# Patient Record
Sex: MALE | Race: BLACK OR AFRICAN AMERICAN | NOT HISPANIC OR LATINO | Employment: FULL TIME | URBAN - METROPOLITAN AREA
[De-identification: names, ages, dates, MRNs, and addresses within clinical notes are randomized per-mention and may not be internally consistent; named-entity substitution may affect disease eponyms.]

---
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
## 2022-04-30 NOTE — ED PROVIDER NOTES
Patient:   Danielle Verma             MRN: [id number]            FIN: [id number]               Age:   19 years     Sex:  Male     :  2000   Associated Diagnoses:   Laceration without foreign body of right little finger without damage to nail, initial encounter   Author:   Anitha Johnson      Basic Information   Time seen: Date & time 2020 20:35:00.   History source: Patient.   Arrival mode: Private vehicle, walking.   History limitation: None.   Additional information: Patient's physician(s): Prerna WILKES, Melina HALL, Chief Complaint from Nursing Triage Note : Chief Complaint   2020 20:19 CST      Chief Complaint           Accident with a broom and a dog, 5th finger on right hand and a laceration.  .      History of Present Illness   The patient presents with right, finger pain, laceration .  The onset was just prior to arrival.  The course/duration of symptoms is constant.  Type of injury: laceration.  The location where the incident occurred was at home.  Location: Right 5th distal finger . The character of symptoms is pain.  The degree of pain is minimal.  The degree of swelling is none.  The exacerbating factor is palpation.  Prior episodes: none.  Therapy today: none.  Associated symptoms: none.        Review of Systems   Constitutional symptoms:  Negative except as documented in HPI.   Skin symptoms:  Negative except as documented in HPI.   Respiratory symptoms:  Negative except as documented in HPI.   Gastrointestinal symptoms:  Negative except as documented in HPI.   Musculoskeletal symptoms:  Negative except as documented in HPI.   Neurologic symptoms:  Negative except as documented in HPI.      Health Status   Allergies:    Allergic Reactions (Selected)  No Known Allergies,    Allergies (1) Active Reaction  No Known Allergies None Documented  .   Medications:  (Selected)   Documented Medications  Documented  OFLOXACIN 0.3% EYE DROPS: .   Immunizations: Tetanus up to date.      Past  Medical/ Family/ Social History   Medical history: Negative.   Surgical history: Negative.   Family history: Not significant.   Social history:    Social & Psychosocial Habits    Alcohol  08/02/2016  Use: Never    Home/Environment  12/29/2017  Lives with: Mother, Siblings    Substance Use  08/02/2016  Use: Never    Tobacco  08/02/2016  Use: Never smoker    11/24/2018  Use: Never smoker    Patient Wants Consult For Cessation Counseling N/A    02/28/2020  Use: Never (less than 100 in l    Patient Wants Consult For Cessation Counseling No    Abuse/Neglect  02/28/2020  SHX Any signs of abuse or neglect No  , Alcohol use: Denies, Tobacco use: Denies, Drug use: Denies, Occupation: A student, Family/social situation: Unmarried, lives with parent(s).   Problem list:    No qualifying data available  .      Physical Examination               Vital Signs   Vital Signs   2/28/2020 20:19 CST      Temperature Oral          36.7 DegC                             Temperature Oral (calculated)             98.06 DegF                             Peripheral Pulse Rate     72 bpm                             Respiratory Rate          16 br/min                             SpO2                      100 %                             Oxygen Therapy            Room air                             Systolic Blood Pressure   142 mmHg  HI                             Diastolic Blood Pressure  74 mmHg  .      Vital Signs (last 24 hrs)_____  Last Charted___________  Temp Oral     36.7 DegC  (FEB 28 20:19)  Heart Rate Peripheral   72 bpm  (FEB 28 20:19)  Resp Rate         16 br/min  (FEB 28 20:19)  SBP      H 142mmHg  (FEB 28 20:19)  DBP      74 mmHg  (FEB 28 20:19)  SpO2      100 %  (FEB 28 20:19)  Weight      79 kg  (FEB 28 20:19)  Height      167 cm  (FEB 28 20:19)  BMI      28.33  (FEB 28 20:19)  .   Measurements   2/28/2020 20:19 CST      Weight Dosing             79 kg                             Weight Measured           79 kg                              Weight Measured and Calculated in Lbs     174.16 lb                             Height/Length Dosing      167 cm                             Height/Length Measured    167 cm                             Body Mass Index Measured  28.33 kg/m2  .   Basic Oxygen Information   2/28/2020 20:19 CST      SpO2                      100 %                             Oxygen Therapy            Room air  .   General:  Alert, no acute distress.    Skin:  Warm, dry, normal for ethnicity.    Head:  Normocephalic.   Ears, nose, mouth and throat:  Oral mucosa moist.   Respiratory:  Respirations are non-labored.   Musculoskeletal:  Normal ROM, Fingers/toes: Right, distal, fifth, finger(s), tenderness, laceration .    Neurological:  Alert and oriented to person, place, time, and situation, No focal neurological deficit observed.    Psychiatric:  Cooperative, appropriate mood & affect, normal judgment.       Medical Decision Making   Results review:     No qualifying data available.   Radiology results:  02/28/2020 20:57; Sandoval FNP, Anitha R; Negative;.      Reexamination/ Reevaluation   Vital signs   Basic Oxygen Information   2/28/2020 20:19 CST      SpO2                      100 %                             Oxygen Therapy            Room air        Procedure   Laceration repair   Consent: Parent, Patient, Has given verbal consent.       Description/ repair   Laceration  0.5 cm in length.Finger: on the left, digit # 5, medial, distal.   Shape: flap.   Depth: superficial.   Details: clean.   Neurovascular/ tendon exam: intact.   Preparation: sterile field established, skin prepped with chlorhexidine.   Irrigation: with saline.   Debridement: none.   Skin closure: Dermabond.   Complexity: single layer.   Post procedure exam: Circulation, motor, sensory examination intact, Bleeding controlled.    Complications: None.    Patient tolerated: Well.    Performed by: Self.    Procedure notes:   0.75 cm circular superficial laceration to  distal portion of R 5th digit. Finger soaked in hibaclens. No foreign bodies identified. Edges approximated and dermabond applied. No blood loss. Patient tolerated procedure.       Impression and Plan   Diagnosis   Laceration without foreign body of right little finger without damage to nail, initial encounter ([id number] S61.216A)   Plan   Condition: Improved, Stable.    Disposition: Medically cleared, Discharged: Time  2/28/2020 20:56:00, to home.    Patient was given the following educational materials: Wound Care, Adult, Laceration Care, Adult, Tissue Adhesive Wound Care, Tissue Adhesive Wound Care.    Follow up with: Wound Care Keep wound clean and dry.  Watch for signs of infection and report to your primary care provider.   Return to ED for worsening of symptoms, fever, uncontrolled bleeding.   Alternate Tylenol 650 mg with ibuprofen 600 mg every 4 hours for pain.   ; FU w/o XR The exam and treatment you received in Emergency Room was for an urgent problem and NOT INTENDED AS COMPLETE CARE. It is important that you FOLLOW UP with a doctor for ongoing care. If your symptoms become WORSE or you DO NOT IMPROVE and you are unable to reach your health care provider, you should RETURN to the emergency department.; Melina Graff.    Counseled: Patient, Regarding diagnosis, Regarding diagnostic results, Regarding treatment plan, Patient indicated understanding of instructions.    Orders: Launch Orders   Admit/Transfer/Discharge:  Discharge (Order): 2/28/2020 20:56 CST, Home.

## 2022-04-30 NOTE — ED PROVIDER NOTES
Patient:   Danielle Vemra             MRN: [id number]            FIN: [id number]               Age:   17 years     Sex:  Male     :  2000   Associated Diagnoses:   Ankle sprain; UC - Ankle Injury   Author:   Shiva WILKES, Pa MINER      Basic Information   Time seen: Date & time 2017 16:23:00.   History source: Patient, mother.   Arrival mode: Private vehicle, Hopping on 1 foot.   History limitation: None.   Additional information: Chief Complaint from Nursing Triage Note : Chief Complaint   2017 16:05 CDT       Chief Complaint           Reports right ankle pain after swelling after twisting while playing basketball PTA. Cold pack applied.  .      History of Present Illness   The patient presents with right, ankle injury, Patient playing basketball jumped up came down turning right ankle inward felt pop noticed swelling hurts to walk.  The onset was just prior to arrival.  The course/duration of symptoms is worsening.  Type of injury: fall and twisting.  Location: Right ankle. The character of symptoms is pain and swelling.  The degree at present is moderate.  There are exacerbating factors including movement, weight bearing and walking.  The relieving factor is none.  The location where the incident occurred was at home.  Risk factors consist of none.  Prior episodes: none.  Therapy today: none.        Review of Systems   Constitutional symptoms:  Negative except as documented in HPI.   Skin symptoms:  Negative except as documented in HPI.   Eye symptoms:  Negative except as documented in HPI.   ENMT symptoms:  Negative except as documented in HPI.   Respiratory symptoms:  Negative except as documented in HPI.   Cardiovascular symptoms:  Negative except as documented in HPI.   Gastrointestinal symptoms:  Negative except as documented in HPI.   Genitourinary symptoms:  Negative except as documented in HPI.   Musculoskeletal symptoms:  See HPI right ankle pain and swelling laterally.   Neurologic  symptoms:  Negative except as documented in HPI.   Psychiatric symptoms:  Negative except as documented in HPI.   Endocrine symptoms:  Negative except as documented in HPI.   Hematologic/Lymphatic symptoms:  Negative except as documented in HPI.   Allergy/immunologic symptoms:  Negative except as documented in HPI.             Additional review of systems information: All other systems reviewed and otherwise negative.      Health Status   Allergies:    Allergic Reactions (Selected)  No Known Allergies,    Allergies (1) Active Reaction  No Known Allergies None Documented  .   Medications:  (Selected)   Prescriptions  Prescribed  Mobic 7.5 mg oral tablet: 7.5 mg = 1 tab(s), Oral, BID, Cleo Quintana MD, # 14 tab(s), 0 Refill(s), per nurse's notes.   Immunizations: Per nurse's notes.      Past Medical/ Family/ Social History   Medical history:    No active or resolved past medical history items have been selected or recorded., Reviewed as documented in chart.   Surgical history:    No active procedure history items have been selected or recorded., Reviewed as documented in chart.   Family history:    No family history items have been selected or recorded., Reviewed as documented in chart.   Social history: Reviewed as documented in chart.   Problem list:    No qualifying data available  , per nurse's notes.      Physical Examination               Vital Signs   Vital Signs   7/8/2017 16:05 CDT       Temperature Temporal Artery               36.1 DegC                             Peripheral Pulse Rate     97 bpm  HI                             Respiratory Rate          20 br/min                             SpO2                      100 %                             Oxygen Therapy            Room air                             Systolic Blood Pressure   135 mmHg                             Diastolic Blood Pressure  75 mmHg  .      Vital Signs (last 24 hrs)_____  Last Charted___________  Heart Rate Peripheral   H 97bpm   (JUL 08 16:05)  Resp Rate         20 br/min  (JUL 08 16:05)  SBP      135 mmHg  (JUL 08 16:05)  DBP      75 mmHg  (JUL 08 16:05)  SpO2      100 %  (JUL 08 16:05)  Weight      65 kg  (JUL 08 16:05)  .   Measurements   7/8/2017 16:05 CDT       Weight Dosing             65 kg                             Weight Measured           65 kg                             Weight Measured and Calculated in Lbs     143.30 lb  .   Basic Oxygen Information   7/8/2017 16:05 CDT       SpO2                      100 %                             Oxygen Therapy            Room air  .   General:  Alert, no acute distress.    Skin:  Warm, dry, intact, normal for ethnicity.    Head:  Normocephalic, atraumatic.    Neck:  Supple, trachea midline, no tenderness.    Eye:  Pupils are equal, round and reactive to light, extraocular movements are intact, normal conjunctiva, vision grossly normal.    Ears, nose, mouth and throat:  Tympanic membranes clear, oral mucosa moist, no pharyngeal erythema or exudate.    Cardiovascular:  Regular rate and rhythm, No murmur, Normal peripheral perfusion.    Respiratory:  Lungs are clear to auscultation, respirations are non-labored, breath sounds are equal, Symmetrical chest wall expansion.    Chest wall:  No tenderness, No deformity.    Back:  Nontender, Normal range of motion, Normal alignment.    Musculoskeletal:  Ankle/foot: Right, Right ankle swollen laterally with tenderness to range of motion noted although painful.   Gastrointestinal:  Soft, Nontender, Non distended, Normal bowel sounds, No organomegaly.    Neurological:  Alert and oriented to person, place, time, and situation, No focal neurological deficit observed, CN II-XII intact, normal sensory observed, normal motor observed, normal speech observed.    Lymphatics:  No lymphadenopathy.   Psychiatric:  Cooperative, appropriate mood & affect.       Medical Decision Making   Differential Diagnosis:  Ankle sprain.   Documents reviewed:  Emergency  department nurses' notes.   Orders  [facility] Order Profile (Selected)   Inpatient Orders  Ordered (Exam Ordered)  XR Ankle Right Minimum 3 Views: Stat, 07/08/17 16:06:00 CDT, Pain, None, Wheelchair, Rad Type, 07/08/17 16:06:00 CDT  Completed  Motrin (Advil) Oral Tab.: 600 mg, form: Tab, Oral, Now, first dose 07/08/17 16:07:00 CDT, stop date 07/08/17 16:07:00 CDT, [phone].   Results review:     No qualifying data available.   Ankle x-ray findings  Interpretation by Emergency Physician, No fracture seen lateral swelling noted.       Reexamination/ Reevaluation   Time: 7/8/2017 16:49:00 .   Vital signs   Basic Oxygen Information   7/8/2017 16:05 CDT       SpO2                      100 %                             Oxygen Therapy            Room air     per nurse's notes   Assessment: exam improved.      Impression and Plan   Diagnosis   Ankle sprain (YXG63-GR S93.409A)   UC - Ankle Injury (PNED [id number])   Plan   Condition: Improved, Stable.    Disposition: Medically cleared, Discharged: Time  7/8/2017 16:50:00, to home.    Prescriptions: Launch prescriptions   Pharmacy:  ibuprofen 600 mg oral tablet (Prescribe): 600 mg = 1 tab(s), Oral, TID, PRN PRN as needed for pain, # 15 tab(s), 0 Refill(s).    Patient was given the following educational materials: Ankle Sprain, Ankle Sprain.    Limitations: Limited activity.    Follow up with: Melina Graff; Call office to Schedule Appointment.    Counseled: Patient, Regarding diagnosis, Regarding diagnostic results, Regarding treatment plan, Regarding prescription, Patient indicated understanding of instructions.    Orders: Launch Orders   Patient Care:  apply  ortho  boot  crutches (Order): 7/8/2017 16:53 CDT, apply  ortho  boot  crutches.
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
## 2024-06-10 ENCOUNTER — HOSPITAL ENCOUNTER (EMERGENCY)
Facility: HOSPITAL | Age: 24
Discharge: HOME | End: 2024-06-10
Attending: INTERNAL MEDICINE

## 2024-06-10 VITALS
OXYGEN SATURATION: 99 % | TEMPERATURE: 99 F | DIASTOLIC BLOOD PRESSURE: 80 MMHG | RESPIRATION RATE: 20 BRPM | SYSTOLIC BLOOD PRESSURE: 120 MMHG | WEIGHT: 148.19 LBS | HEART RATE: 77 BPM

## 2024-06-10 DIAGNOSIS — R11.2 NAUSEA AND VOMITING, UNSPECIFIED VOMITING TYPE: ICD-10-CM

## 2024-06-10 DIAGNOSIS — R10.84 GENERALIZED ABDOMINAL PAIN: Primary | ICD-10-CM

## 2024-06-10 DIAGNOSIS — A08.4 VIRAL GASTROENTERITIS: ICD-10-CM

## 2024-06-10 LAB
ALBUMIN SERPL-MCNC: 4.5 G/DL (ref 3.5–5)
ALBUMIN/GLOB SERPL: 1.3 RATIO (ref 1.1–2)
ALP SERPL-CCNC: 64 UNIT/L (ref 40–150)
ALT SERPL-CCNC: 17 UNIT/L (ref 0–55)
AMPHET UR QL SCN: POSITIVE
AMYLASE SERPL-CCNC: 65 UNIT/L (ref 25–125)
ANION GAP SERPL CALC-SCNC: 9 MEQ/L
AST SERPL-CCNC: 24 UNIT/L (ref 5–34)
BARBITURATE SCN PRESENT UR: NEGATIVE
BASOPHILS # BLD AUTO: 0.02 X10(3)/MCL
BASOPHILS NFR BLD AUTO: 0.3 %
BENZODIAZ UR QL SCN: NEGATIVE
BILIRUB SERPL-MCNC: 0.9 MG/DL
BILIRUB UR QL STRIP.AUTO: NEGATIVE
BUN SERPL-MCNC: 15 MG/DL (ref 8.9–20.6)
CALCIUM SERPL-MCNC: 9.4 MG/DL (ref 8.4–10.2)
CANNABINOIDS UR QL SCN: POSITIVE
CHLORIDE SERPL-SCNC: 106 MMOL/L (ref 98–107)
CLARITY UR: CLEAR
CO2 SERPL-SCNC: 23 MMOL/L (ref 22–29)
COCAINE UR QL SCN: NEGATIVE
COLOR UR AUTO: YELLOW
CREAT SERPL-MCNC: 1.05 MG/DL (ref 0.73–1.18)
CREAT/UREA NIT SERPL: 14
EOSINOPHIL # BLD AUTO: 0.16 X10(3)/MCL (ref 0–0.9)
EOSINOPHIL NFR BLD AUTO: 2.6 %
ERYTHROCYTE [DISTWIDTH] IN BLOOD BY AUTOMATED COUNT: 14 % (ref 11.5–17)
ETHANOL SERPL-MCNC: <10 MG/DL
FENTANYL UR QL SCN: NEGATIVE
GFR SERPLBLD CREATININE-BSD FMLA CKD-EPI: >60 ML/MIN/1.73/M2
GLOBULIN SER-MCNC: 3.5 GM/DL (ref 2.4–3.5)
GLUCOSE SERPL-MCNC: 96 MG/DL (ref 74–100)
GLUCOSE UR QL STRIP: NEGATIVE
HCT VFR BLD AUTO: 44.4 % (ref 42–52)
HGB BLD-MCNC: 13.5 G/DL (ref 14–18)
HGB UR QL STRIP: NEGATIVE
IMM GRANULOCYTES # BLD AUTO: 0.01 X10(3)/MCL (ref 0–0.04)
IMM GRANULOCYTES NFR BLD AUTO: 0.2 %
KETONES UR QL STRIP: ABNORMAL
LACTATE SERPL-SCNC: 0.8 MMOL/L (ref 0.5–2.2)
LEUKOCYTE ESTERASE UR QL STRIP: NEGATIVE
LIPASE SERPL-CCNC: 20 U/L
LYMPHOCYTES # BLD AUTO: 1.2 X10(3)/MCL (ref 0.6–4.6)
LYMPHOCYTES NFR BLD AUTO: 19.1 %
MCH RBC QN AUTO: 22.7 PG (ref 27–31)
MCHC RBC AUTO-ENTMCNC: 30.4 G/DL (ref 33–36)
MCV RBC AUTO: 74.6 FL (ref 80–94)
MDMA UR QL SCN: NEGATIVE
MONOCYTES # BLD AUTO: 0.4 X10(3)/MCL (ref 0.1–1.3)
MONOCYTES NFR BLD AUTO: 6.4 %
NEUTROPHILS # BLD AUTO: 4.48 X10(3)/MCL (ref 2.1–9.2)
NEUTROPHILS NFR BLD AUTO: 71.4 %
NITRITE UR QL STRIP: NEGATIVE
OPIATES UR QL SCN: NEGATIVE
PCP UR QL: NEGATIVE
PH UR STRIP: 6 [PH]
PH UR: 6 [PH] (ref 3–11)
PLATELET # BLD AUTO: 221 X10(3)/MCL (ref 130–400)
PMV BLD AUTO: 11.3 FL (ref 7.4–10.4)
POTASSIUM SERPL-SCNC: 3.5 MMOL/L (ref 3.5–5.1)
PROT SERPL-MCNC: 8 GM/DL (ref 6.4–8.3)
PROT UR QL STRIP: NEGATIVE
RBC # BLD AUTO: 5.95 X10(6)/MCL (ref 4.7–6.1)
SODIUM SERPL-SCNC: 138 MMOL/L (ref 136–145)
SP GR UR STRIP.AUTO: 1.01 (ref 1–1.03)
SPECIFIC GRAVITY, URINE AUTO (.000) (OHS): 1.01 (ref 1–1.03)
UROBILINOGEN UR STRIP-ACNC: 1
WBC # SPEC AUTO: 6.27 X10(3)/MCL (ref 4.5–11.5)

## 2024-06-10 PROCEDURE — 82150 ASSAY OF AMYLASE: CPT | Performed by: INTERNAL MEDICINE

## 2024-06-10 PROCEDURE — 80307 DRUG TEST PRSMV CHEM ANLYZR: CPT | Performed by: INTERNAL MEDICINE

## 2024-06-10 PROCEDURE — 85025 COMPLETE CBC W/AUTO DIFF WBC: CPT | Performed by: INTERNAL MEDICINE

## 2024-06-10 PROCEDURE — 82077 ASSAY SPEC XCP UR&BREATH IA: CPT | Performed by: INTERNAL MEDICINE

## 2024-06-10 PROCEDURE — 99284 EMERGENCY DEPT VISIT MOD MDM: CPT | Mod: 25

## 2024-06-10 PROCEDURE — 80053 COMPREHEN METABOLIC PANEL: CPT | Performed by: INTERNAL MEDICINE

## 2024-06-10 PROCEDURE — 81003 URINALYSIS AUTO W/O SCOPE: CPT | Mod: 59 | Performed by: INTERNAL MEDICINE

## 2024-06-10 PROCEDURE — 83690 ASSAY OF LIPASE: CPT | Performed by: INTERNAL MEDICINE

## 2024-06-10 PROCEDURE — 25000003 PHARM REV CODE 250: Performed by: INTERNAL MEDICINE

## 2024-06-10 PROCEDURE — 83605 ASSAY OF LACTIC ACID: CPT | Performed by: INTERNAL MEDICINE

## 2024-06-10 RX ORDER — ONDANSETRON 4 MG/1
4 TABLET, ORALLY DISINTEGRATING ORAL
Status: COMPLETED | OUTPATIENT
Start: 2024-06-10 | End: 2024-06-10

## 2024-06-10 RX ORDER — ONDANSETRON 4 MG/1
4 TABLET, ORALLY DISINTEGRATING ORAL EVERY 6 HOURS PRN
Qty: 20 TABLET | Refills: 0 | Status: SHIPPED | OUTPATIENT
Start: 2024-06-10 | End: 2024-06-15

## 2024-06-10 RX ORDER — ONDANSETRON 4 MG/1
4 TABLET, ORALLY DISINTEGRATING ORAL EVERY 6 HOURS PRN
Qty: 20 TABLET | Refills: 0 | OUTPATIENT
Start: 2024-06-10 | End: 2024-06-10

## 2024-06-10 RX ORDER — ONDANSETRON 4 MG/1
4 TABLET, ORALLY DISINTEGRATING ORAL EVERY 6 HOURS PRN
Qty: 20 TABLET | Refills: 0 | Status: SHIPPED | OUTPATIENT
Start: 2024-06-10 | End: 2024-06-10

## 2024-06-10 RX ADMIN — ONDANSETRON 4 MG: 4 TABLET, ORALLY DISINTEGRATING ORAL at 08:06

## 2024-06-10 NOTE — ED PROVIDER NOTES
Encounter Date: 6/10/2024       History     Chief Complaint   Patient presents with    Abdominal Pain     Began this am with vomiting and generalized abd pain     23-year-old black male presents emergency department complaining of generalized abdominal pain with some vomiting that started this morning.  His mother gave him a Phenergan and he felt better but when it wore off he started vomiting again so he came to the emergency department      Review of patient's allergies indicates:  No Known Allergies  History reviewed. No pertinent past medical history.  No past surgical history on file.  No family history on file.     Review of Systems   Constitutional: Negative.  Negative for activity change, appetite change, chills, diaphoresis, fatigue, fever and unexpected weight change.   HENT: Negative.  Negative for congestion, dental problem, drooling, ear discharge, ear pain, facial swelling, hearing loss, mouth sores, nosebleeds, postnasal drip, rhinorrhea, sinus pressure, sinus pain, sneezing, sore throat, tinnitus, trouble swallowing and voice change.    Eyes: Negative.  Negative for photophobia, pain, discharge, redness, itching and visual disturbance.   Respiratory: Negative.  Negative for apnea, cough, choking, chest tightness, shortness of breath, wheezing and stridor.    Cardiovascular: Negative.  Negative for chest pain, palpitations and leg swelling.   Gastrointestinal:  Positive for abdominal pain, nausea and vomiting. Negative for abdominal distention, anal bleeding, blood in stool, constipation, diarrhea and rectal pain.   Endocrine: Negative.  Negative for cold intolerance, heat intolerance, polydipsia, polyphagia and polyuria.   Genitourinary: Negative.  Negative for decreased urine volume, difficulty urinating, dysuria, enuresis, flank pain, frequency, genital sores, hematuria, penile discharge, penile pain, penile swelling, scrotal swelling, testicular pain and urgency.   Musculoskeletal: Negative.   Negative for arthralgias, back pain, gait problem, joint swelling, myalgias, neck pain and neck stiffness.   Skin: Negative.  Negative for color change, pallor, rash and wound.   Allergic/Immunologic: Negative.  Negative for environmental allergies, food allergies and immunocompromised state.   Neurological: Negative.  Negative for dizziness, tremors, seizures, syncope, facial asymmetry, speech difficulty, weakness, light-headedness, numbness and headaches.   Hematological: Negative.  Negative for adenopathy. Does not bruise/bleed easily.   Psychiatric/Behavioral: Negative.  Negative for agitation, behavioral problems, confusion, decreased concentration, dysphoric mood, hallucinations, self-injury, sleep disturbance and suicidal ideas. The patient is not nervous/anxious and is not hyperactive.    All other systems reviewed and are negative.      Physical Exam     Initial Vitals [06/10/24 1847]   BP Pulse Resp Temp SpO2   (!) 153/80 76 16 99.2 °F (37.3 °C) 99 %      MAP       --         Physical Exam    Nursing note and vitals reviewed.  Constitutional: He appears well-developed and well-nourished.   HENT:   Head: Normocephalic and atraumatic.   Eyes: Conjunctivae and EOM are normal. Pupils are equal, round, and reactive to light.   Neck: Neck supple.   Normal range of motion.  Cardiovascular:  Normal rate and regular rhythm.           Pulmonary/Chest: Breath sounds normal.   Abdominal: Abdomen is soft. Bowel sounds are normal. There is abdominal tenderness in the epigastric area and periumbilical area. There is no rebound, no guarding, no tenderness at McBurney's point and negative Bosch's sign. negative obturator sign  Musculoskeletal:         General: Normal range of motion.      Cervical back: Normal range of motion and neck supple.     Neurological: He is alert and oriented to person, place, and time.   Skin: Skin is warm and dry. Capillary refill takes less than 2 seconds.   Psychiatric: He has a normal mood  and affect. His behavior is normal. Judgment and thought content normal.         ED Course   Procedures  Labs Reviewed   URINALYSIS, REFLEX TO URINE CULTURE - Abnormal; Notable for the following components:       Result Value    Ketones, UA 2+ (*)     All other components within normal limits   DRUG SCREEN, URINE (BEAKER) - Abnormal; Notable for the following components:    Amphetamines, Urine Positive (*)     Cannabinoids, Urine Positive (*)     All other components within normal limits    Narrative:     Cut off concentrations:    Amphetamines - 1000 ng/ml  Barbiturates - 200 ng/ml  Benzodiazepine - 200 ng/ml  Cannabinoids (THC) - 50 ng/ml  Cocaine - 300 ng/ml  Fentanyl - 1.0 ng/ml  MDMA - 500 ng/ml  Opiates - 300 ng/ml   Phencyclidine (PCP) - 25 ng/ml    Specimen submitted for drug analysis and tested for pH and specific gravity in order to evaluate sample integrity. Suspect tampering if specific gravity is <1.003 and/or pH is not within the range of 4.5 - 8.0  False negatives may result form substances such as bleach added to urine.  False positives may result for the presence of a substance with similar chemical structure to the drug or its metabolite.    This test provides only a PRELIMINARY analytical test result. A more specific alternate chemical method must be used in order to obtain a confirmed analytical result. Gas chromatography/mass spectrometry (GC/MS) is the preferred confirmatory method. Other chemical confirmation methods are available. Clinical consideration and professional judgement should be applied to any drug of abuse test result, particularly when preliminary positive results are used.    Positive results will be confirmed only at the physicians request. Unconfirmed screening results are to be used only for medical purposes (treatment).        CBC WITH DIFFERENTIAL - Abnormal; Notable for the following components:    Hgb 13.5 (*)     MCV 74.6 (*)     MCH 22.7 (*)     MCHC 30.4 (*)     MPV  11.3 (*)     All other components within normal limits   ALCOHOL,MEDICAL (ETHANOL) - Normal   LACTIC ACID, PLASMA - Normal   AMYLASE - Normal   LIPASE - Normal   CBC W/ AUTO DIFFERENTIAL    Narrative:     The following orders were created for panel order CBC auto differential.  Procedure                               Abnormality         Status                     ---------                               -----------         ------                     CBC with Differential[[id number]]       Abnormal            Final result                 Please view results for these tests on the individual orders.   COMPREHENSIVE METABOLIC PANEL          Imaging Results              X-Ray Abdomen Flat And Erect (Final result)  Result time 06/10/24 19:32:48      Final result by Dallas Camargo MD (06/10/24 19:32:48)                   Impression:      No radiographic evidence of an acute process in the abdomen or pelvis.      Electronically signed by: Dallas Camargo  Date:    06/10/2024  Time:    19:32               Narrative:    EXAMINATION:  XR ABDOMEN FLAT AND ERECT    CLINICAL HISTORY:  Generalized abdominal pain;    TECHNIQUE:  AP View(s) of the abdomen was performed.    COMPARISON:  None    FINDINGS:  The bowel gas pattern is nonspecific.    No evidence of free air or pneumatosis.    There is no organomegaly.    No abnormal calcifications.    No acute osseous findings.                                       Medications   ondansetron disintegrating tablet 4 mg (has no administration in time range)     Medical Decision Making  23-year-old black male with generalized abdominal pain nausea vomiting.  Differential diagnosis included gastritis, pancreatitis, cholecystitis, diverticulitis, viral gastroenteritis, bowel obstruction, nephrolithiasis, ureterolithiasis, pyelonephritis, kidney stone.  Workup included blood work and imaging.  His blood work is relatively benign in his imaging shows no acute obstruction or perforation.  His  clinical course is consistent with viral gastroenteritis as it was going around in the area and he has been exposed to it.  Gave 1 dose of oral Zofran told to drink plenty of fluids let it run its course and I will send a script of Zofran into the pharmacy    Problems Addressed:  Generalized abdominal pain: acute illness or injury with systemic symptoms  Nausea and vomiting, unspecified vomiting type: acute illness or injury with systemic symptoms  Viral gastroenteritis: acute illness or injury with systemic symptoms    Amount and/or Complexity of Data Reviewed  Independent Historian: parent  Labs: ordered. Decision-making details documented in ED Course.  Radiology: ordered. Decision-making details documented in ED Course.    Risk  OTC drugs.  Prescription drug management.  Diagnosis or treatment significantly limited by social determinants of health.                                      Clinical Impression:  Final diagnoses:  [R10.84] Generalized abdominal pain (Primary)  [A08.4] Viral gastroenteritis  [R11.2] Nausea and vomiting, unspecified vomiting type          ED Disposition Condition    Discharge Stable          ED Prescriptions       Medication Sig Dispense Start Date End Date Auth. Provider    ondansetron (ZOFRAN-ODT) 4 MG TbDL  (Status: Discontinued) Take 1 tablet (4 mg total) by mouth every 6 (six) hours as needed. 20 tablet 6/10/2024 6/10/2024 Judd Rincon MD    ondansetron (ZOFRAN-ODT) 4 MG TbDL  (Status: Discontinued) Take 1 tablet (4 mg total) by mouth every 6 (six) hours as needed. 20 tablet 6/10/2024 6/10/2024 Judd Rincon MD    ondansetron (ZOFRAN-ODT) 4 MG TbDL Take 1 tablet (4 mg total) by mouth every 6 (six) hours as needed. 20 tablet 6/10/2024 6/15/2024 Judd Rincon MD          Follow-up Information       Follow up With Specialties Details Why Contact Info    David Graff MD Family Medicine In 3 days  [address] [place] [address]  [phone]                Judd Rincon MD  06/10/24 8310

## 2024-08-17 ENCOUNTER — HOSPITAL ENCOUNTER (EMERGENCY)
Facility: HOSPITAL | Age: 24
Discharge: HOME | End: 2024-08-18
Attending: INTERNAL MEDICINE

## 2024-08-17 DIAGNOSIS — F10.920 ALCOHOLIC INTOXICATION WITHOUT COMPLICATION: Primary | ICD-10-CM

## 2024-08-17 LAB
BASOPHILS # BLD AUTO: 0.03 X10(3)/MCL
BASOPHILS NFR BLD AUTO: 0.4 %
EOSINOPHIL # BLD AUTO: 0.9 X10(3)/MCL (ref 0–0.9)
EOSINOPHIL NFR BLD AUTO: 12.4 %
ERYTHROCYTE [DISTWIDTH] IN BLOOD BY AUTOMATED COUNT: 14.9 % (ref 11.5–17)
HCT VFR BLD AUTO: 41.3 % (ref 42–52)
HGB BLD-MCNC: 12.9 G/DL (ref 14–18)
IMM GRANULOCYTES # BLD AUTO: 0.02 X10(3)/MCL (ref 0–0.04)
IMM GRANULOCYTES NFR BLD AUTO: 0.3 %
LYMPHOCYTES # BLD AUTO: 2.64 X10(3)/MCL (ref 0.6–4.6)
LYMPHOCYTES NFR BLD AUTO: 36.4 %
MCH RBC QN AUTO: 22.9 PG (ref 27–31)
MCHC RBC AUTO-ENTMCNC: 31.2 G/DL (ref 33–36)
MCV RBC AUTO: 73.2 FL (ref 80–94)
MONOCYTES # BLD AUTO: 0.68 X10(3)/MCL (ref 0.1–1.3)
MONOCYTES NFR BLD AUTO: 9.4 %
NEUTROPHILS # BLD AUTO: 2.98 X10(3)/MCL (ref 2.1–9.2)
NEUTROPHILS NFR BLD AUTO: 41.1 %
PLATELET # BLD AUTO: 265 X10(3)/MCL (ref 130–400)
PMV BLD AUTO: 10.9 FL (ref 7.4–10.4)
RBC # BLD AUTO: 5.64 X10(6)/MCL (ref 4.7–6.1)
WBC # BLD AUTO: 7.25 X10(3)/MCL (ref 4.5–11.5)

## 2024-08-17 PROCEDURE — 80053 COMPREHEN METABOLIC PANEL: CPT | Performed by: INTERNAL MEDICINE

## 2024-08-17 PROCEDURE — 85025 COMPLETE CBC W/AUTO DIFF WBC: CPT | Performed by: INTERNAL MEDICINE

## 2024-08-17 PROCEDURE — 82077 ASSAY SPEC XCP UR&BREATH IA: CPT | Performed by: INTERNAL MEDICINE

## 2024-08-17 PROCEDURE — 99283 EMERGENCY DEPT VISIT LOW MDM: CPT

## 2024-08-17 RX ORDER — ONDANSETRON 4 MG/1
4 TABLET, ORALLY DISINTEGRATING ORAL
Status: COMPLETED | OUTPATIENT
Start: 2024-08-18 | End: 2024-08-18

## 2024-08-18 VITALS
WEIGHT: 145 LBS | RESPIRATION RATE: 18 BRPM | DIASTOLIC BLOOD PRESSURE: 74 MMHG | TEMPERATURE: 98 F | OXYGEN SATURATION: 99 % | SYSTOLIC BLOOD PRESSURE: 130 MMHG | HEART RATE: 57 BPM

## 2024-08-18 LAB
ALBUMIN SERPL-MCNC: 4.1 G/DL (ref 3.5–5)
ALBUMIN/GLOB SERPL: 1.1 RATIO (ref 1.1–2)
ALP SERPL-CCNC: 65 UNIT/L (ref 40–150)
ALT SERPL-CCNC: 31 UNIT/L (ref 0–55)
AMPHET UR QL SCN: NEGATIVE
ANION GAP SERPL CALC-SCNC: 11 MEQ/L
AST SERPL-CCNC: 27 UNIT/L (ref 5–34)
BARBITURATE SCN PRESENT UR: NEGATIVE
BENZODIAZ UR QL SCN: NEGATIVE
BILIRUB SERPL-MCNC: 0.2 MG/DL
BILIRUB UR QL STRIP.AUTO: NEGATIVE
BUN SERPL-MCNC: 12 MG/DL (ref 8.9–20.6)
CALCIUM SERPL-MCNC: 9.4 MG/DL (ref 8.4–10.2)
CANNABINOIDS UR QL SCN: POSITIVE
CHLORIDE SERPL-SCNC: 109 MMOL/L (ref 98–107)
CLARITY UR: CLEAR
CO2 SERPL-SCNC: 21 MMOL/L (ref 22–29)
COCAINE UR QL SCN: NEGATIVE
COLOR UR AUTO: YELLOW
CREAT SERPL-MCNC: 1.08 MG/DL (ref 0.73–1.18)
CREAT/UREA NIT SERPL: 11
ETHANOL SERPL-MCNC: 174 MG/DL
FENTANYL UR QL SCN: NEGATIVE
GFR SERPLBLD CREATININE-BSD FMLA CKD-EPI: >60 ML/MIN/1.73/M2
GLOBULIN SER-MCNC: 3.8 GM/DL (ref 2.4–3.5)
GLUCOSE SERPL-MCNC: 110 MG/DL (ref 74–100)
GLUCOSE UR QL STRIP: NEGATIVE
HGB UR QL STRIP: NEGATIVE
KETONES UR QL STRIP: NEGATIVE
LEUKOCYTE ESTERASE UR QL STRIP: NEGATIVE
MDMA UR QL SCN: NEGATIVE
NITRITE UR QL STRIP: NEGATIVE
OPIATES UR QL SCN: NEGATIVE
PCP UR QL: NEGATIVE
PH UR STRIP: 5.5 [PH]
PH UR: 5.5 [PH] (ref 3–11)
POTASSIUM SERPL-SCNC: 4 MMOL/L (ref 3.5–5.1)
PROT SERPL-MCNC: 7.9 GM/DL (ref 6.4–8.3)
PROT UR QL STRIP: NEGATIVE
SODIUM SERPL-SCNC: 141 MMOL/L (ref 136–145)
SP GR UR STRIP.AUTO: <=1.005 (ref 1–1.03)
SPECIFIC GRAVITY, URINE AUTO (.000) (OHS): <=1.005 (ref 1–1.03)
UROBILINOGEN UR STRIP-ACNC: 0.2

## 2024-08-18 PROCEDURE — 80307 DRUG TEST PRSMV CHEM ANLYZR: CPT | Performed by: INTERNAL MEDICINE

## 2024-08-18 PROCEDURE — 25000003 PHARM REV CODE 250: Performed by: INTERNAL MEDICINE

## 2024-08-18 PROCEDURE — 81003 URINALYSIS AUTO W/O SCOPE: CPT | Performed by: INTERNAL MEDICINE

## 2024-08-18 RX ORDER — ONDANSETRON 4 MG/1
4 TABLET, ORALLY DISINTEGRATING ORAL EVERY 8 HOURS PRN
Qty: 15 TABLET | Refills: 0 | Status: SHIPPED | OUTPATIENT
Start: 2024-08-18 | End: 2024-08-23

## 2024-08-18 RX ADMIN — ONDANSETRON 4 MG: 4 TABLET, ORALLY DISINTEGRATING ORAL at 12:08

## 2024-08-18 NOTE — ED PROVIDER NOTES
"08/18/2024     11:31 PM  Source of History:  History obtained from the patient.  Mother, and brother    Chief complaint:  From Nurse Triage:  Alcohol Intoxication (Drank " a lot" of crown tonight)    HISTORY OF PRESENT ILLNES:  Danielle Verma is a 24 y.o. male  has no past medical history on file. presenting with Alcohol Intoxication (Drank " a lot" of crown tonight)    REVIEW OF SYSTEMS:   Constitutional symptoms:  No Fever. No Chills    Skin symptoms:  No Rash.    Eye symptoms:  No Visual disturbance reported.   ENMT symptoms:  No Sore throat,    Respiratory symptoms:  No Shortness of Breath, no Cough, no Wheezing.    Cardiovascular symptoms:  No Chest Pain, No Palpitations.   Gastrointestinal symptoms:  No Abdominal Pain, No Nausea, No Vomiting, No Diarrhea, No Constipation.    Genitourinary symptoms:  No Dysuria,    Musculoskeletal symptoms:  No Back pain,    Neurologic symptoms:  No Headache, No Dizziness.  Waxing and waning mental status, slurred speech  Psychiatric symptoms:  No Anxiety, No Depression, No Substance Abuse.              Additional review of systems information: Patient Denies Any Other Complaints.    All Other Systems Reviewed With Patient And Negative.    ALLEGIES:  Review of patient's allergies indicates:  No Known Allergies    MEDICINE LIST:  Current Outpatient Medications   Medication Instructions    ondansetron (ZOFRAN-ODT) 4 mg, Oral, Every 8 hours PRN       PMH:  As per HPI and below:    Reviewed and updated in chart.    PAST MEDICAL HISTORY:  History reviewed. No pertinent past medical history.     PAST SURGICAL HISTORY:  History reviewed. No pertinent surgical history.    SOCIAL HISTORY:  Social History     Tobacco Use    Smoking status: Every Day     Types: Cigarettes, Vaping with nicotine, Cigars    Smokeless tobacco: Never   Substance Use Topics    Alcohol use: Yes    Drug use: Not Currently     Types: Marijuana       FAMILY HISTORY:  Family History   Problem Relation Name Age of " "Onset    Hypertension Mother      Cancer Maternal Aunt      Cancer Maternal Uncle      Hypertension Maternal Grandfather      Heart disease Maternal Grandfather      Cancer Maternal Grandfather          PROBLEM LIST:  There is no problem list on file for this patient.       PHYSICAL EXAM:      ED Triage Vitals [08/17/24 2325]   /78   Pulse 61   Resp 18   Temp 97.8 °F (36.6 °C)   SpO2 98 %        Vital Signs: Reviewed As In Chart.  General:  Awake and Alert, No Cardiorespiratory Distress Noted.   Eye:  Extraocular Movements Are Intact.   ENT: Mucus membranes are moist.   Cardiovascular:  Regular Rate And Rhythm, No Murmur, No Pedal Edema.    Respiratory:  Respirations Nonlabored, No Respiratory Distress, Good Bilateral Air Entry, No Rales, No Rhonchi.    Gastrointestinal:  Soft, Non Distended, Non Tenderness, Normal Bowel Sounds.    Neurological:  Alert And Oriented To Person, Place, Time, And Situation, Normal Motor Observed, minimal slurred Speech Observed.  Musculoskeletal:  No Gross Deformity Noted.     Psychiatric:  Cooperative.  Awake    MEDICAL DECISION MAKING:  Reviewed Nurses Note. Reviewed Vital Signs.   Reviewed Pertinent old records, History and updated as necessary.  Medical Decision Making    Danielle Verma is 24 y.o. male who  has no past medical history on file. arrives in ER with c/o Alcohol Intoxication (Drank " a lot" of crown tonight)    Patient says that he just drank a lot of crown just to have good time, and the mother says that he was going in and out so they just took him here.  Patient is awake alert and says he just drank denies any drug use, denies any other complaints, sitting talking in full sentences with a little slurred speech.    Amount and/or Complexity of Data Reviewed  Labs: ordered.              ED WORKUP FOR MEDICAL DECISION MAKING:    ED ORDERS:  Orders Placed This Encounter   Procedures    CBC auto differential    Comprehensive metabolic panel    Urinalysis, Reflex to " Urine Culture    Ethanol    Drug Screen, Urine    CBC with Differential       ED MEDICINES:  Medications   ondansetron disintegrating tablet 4 mg (4 mg Oral Given 8/18/24 0002)                ED LABS ORDERED AND REVIEWED:  Admission on 08/17/2024   Component Date Value Ref Range Status    Sodium 08/17/2024 141  136 - 145 mmol/L Final    Potassium 08/17/2024 4.0  3.5 - 5.1 mmol/L Final    Chloride 08/17/2024 109 (H)  98 - 107 mmol/L Final    CO2 08/17/2024 21 (L)  22 - 29 mmol/L Final    Glucose 08/17/2024 110 (H)  74 - 100 mg/dL Final    Blood Urea Nitrogen 08/17/2024 12.0  8.9 - 20.6 mg/dL Final    Creatinine 08/17/2024 1.08  0.73 - 1.18 mg/dL Final    Calcium 08/17/2024 9.4  8.4 - 10.2 mg/dL Final    Protein Total 08/17/2024 7.9  6.4 - 8.3 gm/dL Final    Albumin 08/17/2024 4.1  3.5 - 5.0 g/dL Final    Globulin 08/17/2024 3.8 (H)  2.4 - 3.5 gm/dL Final    Albumin/Globulin Ratio 08/17/2024 1.1  1.1 - 2.0 ratio Final    Bilirubin Total 08/17/2024 0.2  <=1.5 mg/dL Final    ALP 08/17/2024 65  40 - 150 unit/L Final    ALT 08/17/2024 31  0 - 55 unit/L Final    AST 08/17/2024 27  5 - 34 unit/L Final    eGFR 08/17/2024 >60  mL/min/1.73/m2 Final    Anion Gap 08/17/2024 11.0  mEq/L Final    BUN/Creatinine Ratio 08/17/2024 11   Final    Color, UA 08/18/2024 Yellow  Yellow, Light-Yellow, Dark Yellow, Rafia, Straw Final    Appearance, UA 08/18/2024 Clear  Clear Final    Specific Gravity, UA 08/18/2024 <=1.005  1.005 - 1.030 Final    pH, UA 08/18/2024 5.5  5.0 - 8.5 Final    Protein, UA 08/18/2024 Negative  Negative Final    Glucose, UA 08/18/2024 Negative  Negative, Normal Final    Ketones, UA 08/18/2024 Negative  Negative Final    Blood, UA 08/18/2024 Negative  Negative Final    Bilirubin, UA 08/18/2024 Negative  Negative Final    Urobilinogen, UA 08/18/2024 0.2  0.2, 1.0, Normal Final    Nitrites, UA 08/18/2024 Negative  Negative Final    Leukocyte Esterase, UA 08/18/2024 Negative  Negative Final    Ethanol Level 08/17/2024  174.0 (H)  <=10.0 mg/dL Final    Amphetamines, Urine 08/18/2024 Negative  Negative Final    Barbiturates, Urine 08/18/2024 Negative  Negative Final    Benzodiazepine, Urine 08/18/2024 Negative  Negative Final    Cannabinoids, Urine 08/18/2024 Positive (A)  Negative Final    Cocaine, Urine 08/18/2024 Negative  Negative Final    Fentanyl, Urine 08/18/2024 Negative  Negative Final    MDMA, Urine 08/18/2024 Negative  Negative Final    Opiates, Urine 08/18/2024 Negative  Negative Final    Phencyclidine, Urine 08/18/2024 Negative  Negative Final    pH, Urine 08/18/2024 5.5  3.0 - 11.0 Final    Specific Gravity, Urine Auto 08/18/2024 <=1.005  1.001 - 1.035 Final    WBC 08/17/2024 7.25  4.50 - 11.50 x10(3)/mcL Final    RBC 08/17/2024 5.64  4.70 - 6.10 x10(6)/mcL Final    Hgb 08/17/2024 12.9 (L)  14.0 - 18.0 g/dL Final    Hct 08/17/2024 41.3 (L)  42.0 - 52.0 % Final    MCV 08/17/2024 73.2 (L)  80.0 - 94.0 fL Final    MCH 08/17/2024 22.9 (L)  27.0 - 31.0 pg Final    MCHC 08/17/2024 31.2 (L)  33.0 - 36.0 g/dL Final    RDW 08/17/2024 14.9  11.5 - 17.0 % Final    Platelet 08/17/2024 265  130 - 400 x10(3)/mcL Final    MPV 08/17/2024 10.9 (H)  7.4 - 10.4 fL Final    Neut % 08/17/2024 41.1  % Final    Lymph % 08/17/2024 36.4  % Final    Mono % 08/17/2024 9.4  % Final    Eos % 08/17/2024 12.4  % Final    Basophil % 08/17/2024 0.4  % Final    Lymph # 08/17/2024 2.64  0.6 - 4.6 x10(3)/mcL Final    Neut # 08/17/2024 2.98  2.1 - 9.2 x10(3)/mcL Final    Mono # 08/17/2024 0.68  0.1 - 1.3 x10(3)/mcL Final    Eos # 08/17/2024 0.90  0 - 0.9 x10(3)/mcL Final    Baso # 08/17/2024 0.03  <=0.2 x10(3)/mcL Final    IG# 08/17/2024 0.02  0 - 0.04 x10(3)/mcL Final    IG% 08/17/2024 0.3  % Final       RADIOLOGY STUDIES ORDERED AND REVIEWED:  Imaging Results    None         ED Course as of 08/18/24 0100   Sun Aug 18, 2024   0059 Patient's alcohol level is 174, has some marijuana on him, rest of the workup is does not reveal any major abnormality, I  will let him go home. [GQ]      ED Course User Index  [GQ] Cleo Quintana MD            PROCEDURES PERFORMED IN ED:  Procedures    DIAGNOSTIC IMPRESSION:        ICD-10-CM ICD-9-CM   1. Alcoholic intoxication without complication  F10.920 305.00         ED Disposition Condition    Discharge Stable               Medication List        START taking these medications      ondansetron 4 MG Tbdl  Commonly known as: ZOFRAN-ODT  Take 1 tablet (4 mg total) by mouth every 8 (eight) hours as needed.               Where to Get Your Medications        These medications were sent to [facility] - [place] - [address]  [address] [place] [address]      Phone: [phone]   ondansetron 4 MG Tbdl          Follow-up Information       David Graff MD In 2 days.    Specialty: Family Medicine  Contact information:  [address]  [place] [address]  [phone]                              ED Prescriptions       Medication Sig Dispense Start Date End Date Auth. Provider    ondansetron (ZOFRAN-ODT) 4 MG TbDL Take 1 tablet (4 mg total) by mouth every 8 (eight) hours as needed. 15 tablet 8/18/2024 8/23/2024 Cleo Quintana MD          Follow-up Information       Follow up With Specialties Details Why Contact Info    David Graff MD Family Medicine In 2 days  [address]  [place] [id number] [phone]                 Cleo Quintana MD  08/18/24 0100